# Patient Record
Sex: FEMALE | Race: WHITE
[De-identification: names, ages, dates, MRNs, and addresses within clinical notes are randomized per-mention and may not be internally consistent; named-entity substitution may affect disease eponyms.]

---

## 2017-04-20 ENCOUNTER — HOSPITAL ENCOUNTER (OUTPATIENT)
Dept: HOSPITAL 53 - M ONCR | Age: 82
End: 2017-04-20
Attending: RADIOLOGY
Payer: MEDICARE

## 2017-04-20 DIAGNOSIS — C44.621: Primary | ICD-10-CM

## 2017-04-20 NOTE — RADONC
RADIATION ONCOLOGY CONSULTATION NOTE:

 

DATE: 04/20/2017

 

CHART NO:  

 

DIAGNOSIS:  Squamous cell carcinoma.

 

STAGE:  Stage is 0, [id number]

 

ECOG PERFORMANCE STATUS:  1

 

Ms. Gaviria is a delightful 83-year-old white female with the diagnosis of a

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

presenting to us today for discussion of external beam radiation therapy as a

therapeutic modality.

 

HISTORY OF PRESENT ILLNESS:

The patient reports that she has had a long history of recurrent lesions on her

thumb.  She reports that she has gone through various treatment options over the

years.

 

Recently on 11/14/2016, the patient said she went through a shave biopsy of her

left thumb skin and pathology revealed a squamous cell carcinoma in situ.  She is

now being seen by us for discussion of possible external beam radiation therapy

for her longstanding problem.

 

PAST MEDICAL HISTORY:

The patient's past medical history is positive for pneumonia, bronchitis,

arthritis and cataracts.  She had a cholecystectomy in the past.  The patient's

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

ago.

 

ALLERGIES:  The patient is allergic to BENADRYL.

 

SOCIAL HISTORY:  The patient does not smoke cigarettes nor abuse alcohol.

 

FAMILY HISTORY:

The patient's family history is positive for AML for which she underwent stem

cell transplantation and chemotherapy.  Other than for her cancer, her father had

esophageal cancer.  Her mother colon cancer and her son prostate cancer.

 

REVIEW OF SYSTEMS:

The patient's review of systems is positive for the physical limitations

secondary to old age.  She also has some hearing loss and has multiple skin

lesions.   She denies nausea, vomiting, fevers, chills, night sweats, diplopia,

headaches, anxiety or depression, anorexia, weight loss, visual disturbances,

chest pain, urinary or bowel difficulties, bone pain, or neurological problems.

 

PHYSICAL EXAMINATION:

The patient is an elderly white female in no acute distress.

HEENT:  Exam is normocephalic, atraumatic.  Extraocular movements are intact.

There are multiple skin lesions over her face and scalp.

There is no palpable preauricular, cervical, supraclavicular, infraclavicular or

axillary lymphadenopathy present.

The patient's skin over her left thumb is red and there is some small scab-like

areas over that piece of the skin.  There are other lesions on her contralateral

hand as well.

 

ASSESSMENT:

I had a very lengthy discussion with this patient with regards to definitive

external beam radiation therapy.  We discussed logistics of treatment planning,

simulation and subsequent fractionated daily radiation treatments.

 

The patient at this time told me she was not sure why we would recommend

treatment.  I let it be known to her that this is a longstanding problem that has

had multiple treatments and that perhaps this time if she wishes to consider a

definitive treatment such as radiation therapy.

 

At the present time, however, the patient wishes just follow this.

 

I have therefore set her up for the routine followup in our office for three

months' time.  We can continue to follow her for any progress and can jump in

with radiation at anytime.

 

I have also instructed the patient and her family to contact me if they change

their mind and would like radiation to begin sooner or if anything progresses and

they wish to undergo treatment or re-evaluation, sooner as well.

 

Thank you for allowing us to participate in the care of this very pleasant woman.

If I could be of any further assistance or provide you with any information,

please free to contact me anytime.

 

As always warm regards,

 

cc:    *Ashley Campos, RPA-C

       *Gerhard Gerard MD

## 2017-06-29 ENCOUNTER — HOSPITAL ENCOUNTER (OUTPATIENT)
Dept: HOSPITAL 53 - M ONCR | Age: 82
End: 2017-06-29
Attending: RADIOLOGY
Payer: MEDICARE

## 2017-06-29 DIAGNOSIS — C44.309: Primary | ICD-10-CM

## 2017-06-30 NOTE — RADONC
RADIATION ONCOLOGY CONSULTATION NOTE

 

DATE:  06/29/2017

 

CHART NUMBER:  

 

DIAGNOSES:

Cutaneous squamous cell carcinoma stage I, T1N0M0.

ECOG performance status zero.

 

CONSULTATION NOTE:

Ms. Gaviria is a very pleasant, 83-year-old white female with the diagnosis of

what appears to be a stage I, T1N0M0 moderately differentiated squamous cell

carcinoma of her forehead who underwent excision on 06/06/2017 and is now

presenting for consideration of postoperative radiation therapy.

 

HISTORY OF PRESENT ILLNESS:

The patient initially presented to us on 04/2017 for discussion of possible

radiation therapy to her left thumb for a stage 0, [id number] squamous cell

carcinoma in situ.

 

The patient decided against treatment to that area.  Since consultation, she

developed a rapidly progressive lesion over her forehead.

 

She was seen by Dr. Lundberg in [place] and underwent complete surgical excision on

06/06/2017.  Pathology revealed a moderately differentiated invasive squamous

cell carcinoma.  All margins, including lateral and deep margins, were negative

for malignancy.  The tumor measured 1.8 cm x 2 cm in dimension.

 

The patient is presenting today and still has her stitches in.  She is not quite

sure why she is here at this time.

 

PAST MEDICAL HISTORY:

The patient's past medical history is positive for pneumonia, bronchitis,

arthritis and cataracts.  She had a cholecystectomy in the past.  She has a

significant history for AML for which she received chemotherapy 15 years ago.

 

ALLERGIES:  The patient is allergic to BENADRYL.

 

SOCIAL HISTORY:  The patient does not smoke cigarettes nor abuse alcohol.

 

FAMILY HISTORY:  The patient's family history is positive for AML for which she

underwent stem cell transplantation and chemotherapy.  Other than for her cancer,

her father had esophageal cancer, her mother had colon cancer and her son had

prostate cancer.

 

REVIEW OF SYSTEMS:

The patient's review of systems is positive for some discomfort over the surgical

site on her forehead.  It is otherwise noncontributory.  Denies nausea, vomiting,

fevers, chills, night sweats, diplopia, headaches, anxiety or depression,

anorexia, weight loss, visual disturbances, chest pain, urinary or bowel

difficulties, bone pain, or neurological problems.

 

PHYSICAL EXAMINATION:

The patient is an elderly white female in no acute distress.  She has a large

surgical scar present with stitches across her forehead.  The skin around the

scar is somewhat erythematous.  There are no other lesions of suspicion over the

face or neck.  There is no palpable preauricular, cervical, supraclavicular,

infraclavicular or axillary lymphadenopathy present.  The lungs are clear to

auscultation and percussion.

 

ASSESSMENT:

At this time, I do not see an indication for postoperative radiation therapy.

Indeed, she had a stage T1 lesion and all surgical margins including the deep

margin of resection appeared to be negative for malignancy.

 

In light of this, I have recommended close followup.

 

Indeed, the patient is scheduled to see her surgeon tomorrow for removal of her

stitches and setting up of a followup care schedule.

 

The patient also is continuing her followup for her thumb lesion which she

decided not to treat several months ago.  That appears to be stable.

 

I have not set up any official followup in our office.  I made the patient aware

we are here for her and we would be more than happy to see her at any time if she

has any questions or if we could be of any assistance whatsoever.  In the

meantime, once again, she will be followed by her other physicians.

 

 

 

 

 

 

cc:    MD JM Balderrama MD Amy Werchinski, PA

## 2020-04-08 ENCOUNTER — HOSPITAL ENCOUNTER (OUTPATIENT)
Dept: HOSPITAL 53 - M LAB REF | Age: 85
End: 2020-04-08
Attending: DERMATOLOGY
Payer: MEDICARE

## 2020-04-08 DIAGNOSIS — C44.622: Primary | ICD-10-CM

## 2020-04-08 DIAGNOSIS — C44.729: ICD-10-CM

## 2020-04-08 DIAGNOSIS — C44.320: ICD-10-CM

## 2020-09-29 ENCOUNTER — HOSPITAL ENCOUNTER (OUTPATIENT)
Dept: HOSPITAL 53 - M PLALAB | Age: 85
End: 2020-09-29
Attending: DERMATOLOGY
Payer: MEDICARE

## 2020-09-29 DIAGNOSIS — C44.729: Primary | ICD-10-CM

## 2020-09-29 DIAGNOSIS — Z79.899: ICD-10-CM

## 2020-09-29 LAB
ALBUMIN SERPL BCG-MCNC: 3.8 GM/DL (ref 3.2–5.2)
ALT SERPL W P-5'-P-CCNC: 24 U/L (ref 12–78)
BILIRUB CONJ SERPL-MCNC: < 0.1 MG/DL (ref 0–0.2)
BILIRUB SERPL-MCNC: 0.4 MG/DL (ref 0.2–1)
HCT VFR BLD AUTO: 39.8 % (ref 36–47)
HGB BLD-MCNC: 12.7 G/DL (ref 12–15.5)
MCH RBC QN AUTO: 31.6 PG (ref 27–33)
MCHC RBC AUTO-ENTMCNC: 31.9 G/DL (ref 32–36.5)
MCV RBC AUTO: 99 FL (ref 80–96)
PLATELET # BLD AUTO: 170 10^3/UL (ref 150–450)
PROT SERPL-MCNC: 7.3 GM/DL (ref 6.4–8.2)
RBC # BLD AUTO: 4.02 10^6/UL (ref 4–5.4)
TRIGL SERPL-MCNC: 161 MG/DL (ref ?–150)
WBC # BLD AUTO: 4.8 10^3/UL (ref 4–10)

## 2020-09-29 PROCEDURE — 80076 HEPATIC FUNCTION PANEL: CPT

## 2020-09-29 PROCEDURE — 85027 COMPLETE CBC AUTOMATED: CPT

## 2020-09-29 PROCEDURE — 84478 ASSAY OF TRIGLYCERIDES: CPT

## 2020-09-29 PROCEDURE — 36415 COLL VENOUS BLD VENIPUNCTURE: CPT

## 2020-10-22 ENCOUNTER — HOSPITAL ENCOUNTER (OUTPATIENT)
Dept: HOSPITAL 53 - M LAB REF | Age: 85
End: 2020-10-22
Attending: DERMATOLOGY
Payer: MEDICARE

## 2020-10-22 DIAGNOSIS — D04.112: Primary | ICD-10-CM

## 2020-10-22 PROCEDURE — 11102 TANGNTL BX SKIN SINGLE LES: CPT

## 2020-10-22 PROCEDURE — 88305 TISSUE EXAM BY PATHOLOGIST: CPT

## 2021-09-14 ENCOUNTER — HOSPITAL ENCOUNTER (OUTPATIENT)
Dept: HOSPITAL 53 - M LAB REF | Age: 86
End: 2021-09-14
Attending: INTERNAL MEDICINE
Payer: MEDICARE

## 2021-09-14 DIAGNOSIS — E03.9: Primary | ICD-10-CM

## 2021-09-14 LAB
T4 FREE SERPL-MCNC: 1.37 NG/DL (ref 0.76–1.46)
TSH SERPL DL<=0.005 MIU/L-ACNC: 4.26 UIU/ML (ref 0.36–3.74)

## 2021-12-14 ENCOUNTER — HOSPITAL ENCOUNTER (OUTPATIENT)
Dept: HOSPITAL 53 - M LAB REF | Age: 86
End: 2021-12-14
Attending: INTERNAL MEDICINE
Payer: MEDICARE

## 2021-12-14 DIAGNOSIS — D50.9: Primary | ICD-10-CM

## 2021-12-14 DIAGNOSIS — E03.9: ICD-10-CM

## 2021-12-14 DIAGNOSIS — D53.1: ICD-10-CM

## 2021-12-14 LAB
FERRITIN SERPL-MCNC: 1162 NG/ML (ref 8–252)
IRON SATN MFR SERPL: 11.6 % (ref 13.2–45)
IRON SERPL-MCNC: 26 UG/DL (ref 50–170)
T4 FREE SERPL-MCNC: 1.15 NG/DL (ref 0.76–1.46)
TIBC SERPL-MCNC: 224 UG/DL (ref 250–450)
TSH SERPL DL<=0.005 MIU/L-ACNC: 2.23 UIU/ML (ref 0.36–3.74)

## 2021-12-15 LAB
FOLATE SERPL-MCNC: 11 NG/ML
VIT B12 SERPL-MCNC: 628 PG/ML

## 2022-10-19 ENCOUNTER — HOSPITAL ENCOUNTER (OUTPATIENT)
Dept: HOSPITAL 53 - M SFHCDERM | Age: 87
End: 2022-10-19
Attending: DERMATOLOGY
Payer: MEDICARE

## 2022-10-19 DIAGNOSIS — C44.722: ICD-10-CM

## 2022-10-19 DIAGNOSIS — C44.729: Primary | ICD-10-CM

## 2022-11-09 ENCOUNTER — HOSPITAL ENCOUNTER (OUTPATIENT)
Dept: HOSPITAL 53 - M SFHCDERM | Age: 87
End: 2022-11-09
Attending: PHYSICIAN ASSISTANT
Payer: MEDICARE

## 2022-11-09 DIAGNOSIS — B35.9: ICD-10-CM

## 2022-11-09 DIAGNOSIS — C44.622: Primary | ICD-10-CM

## 2023-01-06 ENCOUNTER — HOSPITAL ENCOUNTER (OUTPATIENT)
Dept: HOSPITAL 53 - M SFHCWOUN | Age: 88
End: 2023-01-06
Attending: PHYSICIAN ASSISTANT
Payer: MEDICARE

## 2023-01-06 DIAGNOSIS — C7A.1: Primary | ICD-10-CM

## 2023-01-17 ENCOUNTER — HOSPITAL ENCOUNTER (OUTPATIENT)
Dept: HOSPITAL 53 - M ONCR | Age: 88
End: 2023-01-17
Attending: RADIOLOGY
Payer: MEDICARE

## 2023-01-17 DIAGNOSIS — E78.5: ICD-10-CM

## 2023-01-17 DIAGNOSIS — C4A.71: Primary | ICD-10-CM

## 2023-01-17 DIAGNOSIS — Z80.8: ICD-10-CM

## 2023-01-17 DIAGNOSIS — Z88.8: ICD-10-CM

## 2023-01-17 DIAGNOSIS — Z85.828: ICD-10-CM

## 2023-01-17 DIAGNOSIS — Z79.899: ICD-10-CM

## 2023-01-26 ENCOUNTER — HOSPITAL ENCOUNTER (OUTPATIENT)
Dept: HOSPITAL 53 - M PAL | Age: 88
End: 2023-01-26
Attending: NURSE PRACTITIONER
Payer: MEDICARE

## 2023-01-26 VITALS — WEIGHT: 109.79 LBS | HEIGHT: 59 IN | BODY MASS INDEX: 22.13 KG/M2

## 2023-01-26 VITALS — DIASTOLIC BLOOD PRESSURE: 65 MMHG | SYSTOLIC BLOOD PRESSURE: 114 MMHG

## 2023-01-26 DIAGNOSIS — R60.9: ICD-10-CM

## 2023-01-26 DIAGNOSIS — Z94.84: ICD-10-CM

## 2023-01-26 DIAGNOSIS — R53.83: ICD-10-CM

## 2023-01-26 DIAGNOSIS — Z66: ICD-10-CM

## 2023-01-26 DIAGNOSIS — Z79.899: ICD-10-CM

## 2023-01-26 DIAGNOSIS — Z85.6: ICD-10-CM

## 2023-01-26 DIAGNOSIS — Z80.8: ICD-10-CM

## 2023-01-26 DIAGNOSIS — C4A.72: Primary | ICD-10-CM

## 2023-01-26 DIAGNOSIS — N18.9: ICD-10-CM

## 2023-01-26 DIAGNOSIS — F43.23: ICD-10-CM

## 2023-01-26 DIAGNOSIS — Z51.5: ICD-10-CM

## 2023-02-01 ENCOUNTER — HOSPITAL ENCOUNTER (OUTPATIENT)
Dept: HOSPITAL 53 - M ONCR | Age: 88
LOS: 27 days | End: 2023-02-28
Attending: RADIOLOGY
Payer: MEDICARE

## 2023-02-01 DIAGNOSIS — C4A.71: Primary | ICD-10-CM

## 2023-02-03 ENCOUNTER — HOSPITAL ENCOUNTER (INPATIENT)
Dept: HOSPITAL 53 - M ED | Age: 88
LOS: 4 days | Discharge: HOSPICE | DRG: 813 | End: 2023-02-07
Attending: INTERNAL MEDICINE | Admitting: INTERNAL MEDICINE
Payer: MEDICARE

## 2023-02-03 VITALS — BODY MASS INDEX: 22.62 KG/M2 | HEIGHT: 59 IN | WEIGHT: 112.22 LBS

## 2023-02-03 VITALS — DIASTOLIC BLOOD PRESSURE: 53 MMHG | SYSTOLIC BLOOD PRESSURE: 108 MMHG

## 2023-02-03 DIAGNOSIS — R31.0: ICD-10-CM

## 2023-02-03 DIAGNOSIS — N18.30: ICD-10-CM

## 2023-02-03 DIAGNOSIS — D69.6: Primary | ICD-10-CM

## 2023-02-03 DIAGNOSIS — C92.00: ICD-10-CM

## 2023-02-03 DIAGNOSIS — Z79.899: ICD-10-CM

## 2023-02-03 DIAGNOSIS — Z94.81: ICD-10-CM

## 2023-02-03 DIAGNOSIS — C4A.71: ICD-10-CM

## 2023-02-03 DIAGNOSIS — K74.60: ICD-10-CM

## 2023-02-03 DIAGNOSIS — D62: ICD-10-CM

## 2023-02-03 DIAGNOSIS — Z66: ICD-10-CM

## 2023-02-03 DIAGNOSIS — K64.9: ICD-10-CM

## 2023-02-03 DIAGNOSIS — R74.01: ICD-10-CM

## 2023-02-03 DIAGNOSIS — K92.1: ICD-10-CM

## 2023-02-03 DIAGNOSIS — J90: ICD-10-CM

## 2023-02-03 DIAGNOSIS — H40.9: ICD-10-CM

## 2023-02-03 DIAGNOSIS — Z90.79: ICD-10-CM

## 2023-02-03 DIAGNOSIS — R18.8: ICD-10-CM

## 2023-02-03 DIAGNOSIS — Z88.8: ICD-10-CM

## 2023-02-03 DIAGNOSIS — R62.7: ICD-10-CM

## 2023-02-03 DIAGNOSIS — Z90.49: ICD-10-CM

## 2023-02-03 DIAGNOSIS — N17.9: ICD-10-CM

## 2023-02-03 DIAGNOSIS — Z20.822: ICD-10-CM

## 2023-02-03 LAB
ALBUMIN SERPL BCG-MCNC: 2.1 G/DL (ref 3.2–5.2)
ALP SERPL-CCNC: 594 U/L (ref 46–116)
ALT SERPL W P-5'-P-CCNC: 85 U/L (ref 7–40)
AMYLASE SERPL-CCNC: 20 U/L (ref 30–118)
ANISOCYTOSIS BLD QL SMEAR: (no result)
APTT BLD: 21.2 SECONDS (ref 24.8–34.2)
AST SERPL-CCNC: 368 U/L (ref ?–34)
BILIRUB CONJ SERPL-MCNC: 1.8 MG/DL (ref ?–0.4)
BILIRUB SERPL-MCNC: 2.7 MG/DL (ref 0.3–1.2)
BUN SERPL-MCNC: 34 MG/DL (ref 9–23)
CALCIUM SERPL-MCNC: 9.2 MG/DL (ref 8.3–10.6)
CHLORIDE SERPL-SCNC: 104 MMOL/L (ref 98–107)
CO2 SERPL-SCNC: 21 MMOL/L (ref 20–31)
CREAT SERPL-MCNC: 2.32 MG/DL (ref 0.55–1.3)
EOSINOPHIL NFR BLD MANUAL: 1 % (ref 0–3)
GFR SERPL CREATININE-BSD FRML MDRD: 21 ML/MIN/{1.73_M2} (ref 32–?)
GLUCOSE SERPL-MCNC: 98 MG/DL (ref 74–106)
HBV CORE IGM SER QL: NEGATIVE
HBV SURFACE AB SER-ACNC: NEGATIVE M[IU]/ML
HCT VFR BLD AUTO: 33.8 % (ref 36–47)
HCV AB SER QL: 0 INDEX (ref ?–0.8)
HGB BLD-MCNC: 11.6 G/DL (ref 12–15.5)
INR PPP: 1.01
LIPASE SERPL-CCNC: 44 U/L (ref 12–53)
LYMPHOCYTES NFR BLD MANUAL: 14 % (ref 16–44)
MCH RBC QN AUTO: 34.1 PG (ref 27–33)
MCHC RBC AUTO-ENTMCNC: 34.3 G/DL (ref 32–36.5)
MCV RBC AUTO: 99.4 FL (ref 80–96)
METAMYELOCYTES NFR BLD MANUAL: 2 % (ref 0–0)
MONOCYTES NFR BLD MANUAL: 11 % (ref 0–5)
MYELOBLASTS NFR BLD MANUAL: 1 % (ref 0–0)
NEUTROPHILS NFR BLD MANUAL: 65 % (ref 28–66)
NRBC BLD MANUAL-RTO: 3 % (ref 0–0)
PLATELET # BLD AUTO: 28 10^3/UL (ref 150–450)
PLATELET BLD QL SMEAR: (no result)
POTASSIUM SERPL-SCNC: 4.6 MMOL/L (ref 3.5–5.1)
PROT SERPL-MCNC: 5.1 G/DL (ref 5.7–8.2)
PROTHROMBIN TIME: 13.5 SECONDS (ref 12.5–14.5)
RBC # BLD AUTO: 3.4 10^6/UL (ref 4–5.4)
RSV RNA NPH QL NAA+PROBE: NEGATIVE
SODIUM SERPL-SCNC: 133 MMOL/L (ref 136–145)
VARIANT LYMPHS NFR BLD MANUAL: 1 % (ref 0–5)
WBC # BLD AUTO: 13.5 10^3/UL (ref 4–10)

## 2023-02-03 RX ADMIN — ONDANSETRON PRN MG: 4 TABLET, ORALLY DISINTEGRATING ORAL at 21:08

## 2023-02-03 RX ADMIN — SODIUM CHLORIDE SCH MLS/HR: 9 INJECTION, SOLUTION INTRAVENOUS at 19:43

## 2023-02-03 RX ADMIN — SODIUM CHLORIDE SCH MLS/HR: 9 INJECTION, SOLUTION INTRAVENOUS at 21:39

## 2023-02-03 RX ADMIN — LATANOPROST SCH DROP: 50 SOLUTION OPHTHALMIC at 21:00

## 2023-02-04 VITALS — DIASTOLIC BLOOD PRESSURE: 58 MMHG | SYSTOLIC BLOOD PRESSURE: 122 MMHG

## 2023-02-04 VITALS — DIASTOLIC BLOOD PRESSURE: 50 MMHG | SYSTOLIC BLOOD PRESSURE: 98 MMHG

## 2023-02-04 VITALS — SYSTOLIC BLOOD PRESSURE: 103 MMHG | DIASTOLIC BLOOD PRESSURE: 52 MMHG

## 2023-02-04 VITALS — DIASTOLIC BLOOD PRESSURE: 52 MMHG | SYSTOLIC BLOOD PRESSURE: 94 MMHG

## 2023-02-04 VITALS — SYSTOLIC BLOOD PRESSURE: 97 MMHG | DIASTOLIC BLOOD PRESSURE: 51 MMHG

## 2023-02-04 VITALS — DIASTOLIC BLOOD PRESSURE: 64 MMHG | SYSTOLIC BLOOD PRESSURE: 136 MMHG

## 2023-02-04 VITALS — DIASTOLIC BLOOD PRESSURE: 55 MMHG | SYSTOLIC BLOOD PRESSURE: 118 MMHG

## 2023-02-04 VITALS — SYSTOLIC BLOOD PRESSURE: 92 MMHG | DIASTOLIC BLOOD PRESSURE: 50 MMHG

## 2023-02-04 VITALS — DIASTOLIC BLOOD PRESSURE: 56 MMHG | SYSTOLIC BLOOD PRESSURE: 113 MMHG

## 2023-02-04 VITALS — OXYGEN SATURATION: 90 %

## 2023-02-04 LAB
ALBUMIN SERPL BCG-MCNC: 2 G/DL (ref 3.2–5.2)
ALP SERPL-CCNC: 603 U/L (ref 46–116)
ALT SERPL W P-5'-P-CCNC: 79 U/L (ref 7–40)
APPEARANCE UR: CLEAR
AST SERPL-CCNC: 383 U/L (ref ?–34)
BACTERIA URNS QL MICRO: (no result)
BILIRUB SERPL-MCNC: 3.5 MG/DL (ref 0.3–1.2)
BILIRUB UR QL STRIP: NEGATIVE
BUN SERPL-MCNC: 38 MG/DL (ref 9–23)
CALCIUM SERPL-MCNC: 8.7 MG/DL (ref 8.3–10.6)
CHLORIDE SERPL-SCNC: 105 MMOL/L (ref 98–107)
CO2 SERPL-SCNC: 19 MMOL/L (ref 20–31)
COLOR UR: (no result)
CREAT SERPL-MCNC: 2.44 MG/DL (ref 0.55–1.3)
CREAT UR-MCNC: 159.9 MG/DL
GFR SERPL CREATININE-BSD FRML MDRD: 19.9 ML/MIN/{1.73_M2} (ref 32–?)
GLUCOSE SERPL-MCNC: 70 MG/DL (ref 74–106)
GLUCOSE UR STRIP-MCNC: NEGATIVE MG/DL
HCT VFR BLD AUTO: 31.8 % (ref 36–47)
HGB BLD-MCNC: 10.6 G/DL (ref 12–15.5)
HGB UR QL STRIP: NEGATIVE
HYALINE CASTS URNS QL MICRO: (no result) /LPF (ref 0–1)
KETONES UR QL STRIP: NEGATIVE MG/DL
LEUKOCYTE ESTERASE UR QL STRIP: POSITIVE
MAGNESIUM SERPL-MCNC: 1.7 MG/DL (ref 1.8–2.4)
MCH RBC QN AUTO: 33.2 PG (ref 27–33)
MCHC RBC AUTO-ENTMCNC: 33.3 G/DL (ref 32–36.5)
MCV RBC AUTO: 99.7 FL (ref 80–96)
MUCOUS THREADS URNS QL MICRO: (no result)
NITRITE UR QL STRIP: NEGATIVE
OSMOLALITY UR: 418 MOSM/KG (ref 50–1400)
PH UR STRIP: 5 UNITS (ref 5–7)
PLATELET # BLD AUTO: 25 10^3/UL (ref 150–450)
POTASSIUM SERPL-SCNC: 4.6 MMOL/L (ref 3.5–5.1)
PROT SERPL-MCNC: 4.8 G/DL (ref 5.7–8.2)
PROT UR STRIP-MCNC: (no result) MG/DL
PROT UR-MCNC: 62.7 MG/DL (ref 0–14)
RBC # BLD AUTO: 3.19 10^6/UL (ref 4–5.4)
RBC #/AREA URNS HPF: (no result) /HPF (ref 0–3)
SODIUM SERPL-SCNC: 135 MMOL/L (ref 136–145)
SODIUM UR-SCNC: 13 MMOL/L
SP GR UR STRIP: 1.02 (ref 1–1.03)
SQUAMOUS URNS QL MICRO: (no result) /HPF
TRANS CELLS #/AREA URNS HPF: (no result) /HPF
UROBILINOGEN UR QL STRIP: NORMAL MG/DL
WBC # BLD AUTO: 13.1 10^3/UL (ref 4–10)
WBC #/AREA URNS HPF: (no result) /HPF (ref 0–3)
WBC CASTS #/AREA URNS LPF: (no result) /LPF

## 2023-02-04 RX ADMIN — BRIMONIDINE TARTRATE SCH DROP: 1 SOLUTION/ DROPS OPHTHALMIC at 09:46

## 2023-02-04 RX ADMIN — ONDANSETRON PRN MG: 4 TABLET, ORALLY DISINTEGRATING ORAL at 03:57

## 2023-02-04 RX ADMIN — ONDANSETRON PRN MG: 4 TABLET, ORALLY DISINTEGRATING ORAL at 12:31

## 2023-02-04 RX ADMIN — LATANOPROST SCH DROP: 50 SOLUTION OPHTHALMIC at 21:00

## 2023-02-04 RX ADMIN — ONDANSETRON PRN MG: 4 TABLET, ORALLY DISINTEGRATING ORAL at 20:00

## 2023-02-04 RX ADMIN — BRINZOLAMIDE SCH DROP: 10 SUSPENSION/ DROPS OPHTHALMIC at 09:46

## 2023-02-04 RX ADMIN — TIMOLOL MALEATE SCH DROP: 5 SOLUTION OPHTHALMIC at 09:47

## 2023-02-05 RX ADMIN — TIMOLOL MALEATE SCH DROP: 5 SOLUTION OPHTHALMIC at 09:14

## 2023-02-05 RX ADMIN — LORAZEPAM PRN MG: 2 INJECTION INTRAMUSCULAR; INTRAVENOUS at 20:08

## 2023-02-05 RX ADMIN — LATANOPROST SCH DROP: 50 SOLUTION OPHTHALMIC at 20:09

## 2023-02-05 RX ADMIN — ONDANSETRON PRN MG: 4 TABLET, ORALLY DISINTEGRATING ORAL at 12:34

## 2023-02-05 RX ADMIN — ONDANSETRON PRN MG: 4 TABLET, ORALLY DISINTEGRATING ORAL at 09:13

## 2023-02-05 RX ADMIN — LORAZEPAM PRN MG: 2 INJECTION INTRAMUSCULAR; INTRAVENOUS at 02:25

## 2023-02-05 RX ADMIN — ONDANSETRON PRN MG: 4 TABLET, ORALLY DISINTEGRATING ORAL at 06:05

## 2023-02-05 RX ADMIN — NYSTATIN SCH DOSE: 100000 POWDER TOPICAL at 21:54

## 2023-02-05 RX ADMIN — ONDANSETRON PRN MG: 4 TABLET, ORALLY DISINTEGRATING ORAL at 17:06

## 2023-02-05 RX ADMIN — BRINZOLAMIDE SCH DROP: 10 SUSPENSION/ DROPS OPHTHALMIC at 09:13

## 2023-02-05 RX ADMIN — ONDANSETRON PRN MG: 4 TABLET, ORALLY DISINTEGRATING ORAL at 00:52

## 2023-02-05 RX ADMIN — BRIMONIDINE TARTRATE SCH DROP: 1 SOLUTION/ DROPS OPHTHALMIC at 09:13

## 2023-02-06 RX ADMIN — TIMOLOL MALEATE SCH DROP: 5 SOLUTION OPHTHALMIC at 10:05

## 2023-02-06 RX ADMIN — ONDANSETRON PRN MG: 4 TABLET, ORALLY DISINTEGRATING ORAL at 17:46

## 2023-02-06 RX ADMIN — LORAZEPAM PRN MG: 2 INJECTION INTRAMUSCULAR; INTRAVENOUS at 20:30

## 2023-02-06 RX ADMIN — LATANOPROST SCH DROP: 50 SOLUTION OPHTHALMIC at 20:30

## 2023-02-06 RX ADMIN — NYSTATIN SCH DOSE: 100000 POWDER TOPICAL at 20:30

## 2023-02-06 RX ADMIN — NYSTATIN SCH DOSE: 100000 POWDER TOPICAL at 10:06

## 2023-02-06 RX ADMIN — BRINZOLAMIDE SCH DROP: 10 SUSPENSION/ DROPS OPHTHALMIC at 10:05

## 2023-02-06 RX ADMIN — BRIMONIDINE TARTRATE SCH DROP: 1 SOLUTION/ DROPS OPHTHALMIC at 10:04

## 2023-02-06 RX ADMIN — TIMOLOL MALEATE SCH DROP: 5 SOLUTION OPHTHALMIC at 09:00

## 2023-02-07 RX ADMIN — ONDANSETRON PRN MG: 4 TABLET, ORALLY DISINTEGRATING ORAL at 09:18

## 2023-02-07 RX ADMIN — TIMOLOL MALEATE SCH DROP: 5 SOLUTION OPHTHALMIC at 09:18

## 2023-02-07 RX ADMIN — BRIMONIDINE TARTRATE SCH DROP: 1 SOLUTION/ DROPS OPHTHALMIC at 09:18

## 2023-02-07 RX ADMIN — BRINZOLAMIDE SCH DROP: 10 SUSPENSION/ DROPS OPHTHALMIC at 09:18

## 2023-02-07 RX ADMIN — NYSTATIN SCH DOSE: 100000 POWDER TOPICAL at 09:19
